# Patient Record
Sex: MALE | Race: ASIAN | NOT HISPANIC OR LATINO | Employment: PART TIME | URBAN - METROPOLITAN AREA
[De-identification: names, ages, dates, MRNs, and addresses within clinical notes are randomized per-mention and may not be internally consistent; named-entity substitution may affect disease eponyms.]

---

## 2019-03-11 ENCOUNTER — OFFICE VISIT (OUTPATIENT)
Dept: URGENT CARE | Facility: CLINIC | Age: 29
End: 2019-03-11
Payer: COMMERCIAL

## 2019-03-11 VITALS
OXYGEN SATURATION: 96 % | HEIGHT: 65 IN | BODY MASS INDEX: 22.96 KG/M2 | TEMPERATURE: 98.6 F | DIASTOLIC BLOOD PRESSURE: 68 MMHG | SYSTOLIC BLOOD PRESSURE: 108 MMHG | RESPIRATION RATE: 16 BRPM | WEIGHT: 137.8 LBS | HEART RATE: 78 BPM

## 2019-03-11 DIAGNOSIS — J22 LRTI (LOWER RESPIRATORY TRACT INFECTION): ICD-10-CM

## 2019-03-11 PROCEDURE — 99204 OFFICE O/P NEW MOD 45 MIN: CPT | Performed by: FAMILY MEDICINE

## 2019-03-11 RX ORDER — BENZONATATE 100 MG/1
100 CAPSULE ORAL 3 TIMES DAILY PRN
Qty: 60 CAP | Refills: 0 | Status: SHIPPED | OUTPATIENT
Start: 2019-03-11

## 2019-03-11 RX ORDER — AZITHROMYCIN 250 MG/1
TABLET, FILM COATED ORAL
Qty: 6 TAB | Refills: 0 | Status: SHIPPED | OUTPATIENT
Start: 2019-03-11 | End: 2019-04-29

## 2019-03-11 ASSESSMENT — ENCOUNTER SYMPTOMS
VOMITING: 0
NAUSEA: 0
MYALGIAS: 0
FEVER: 0
COUGH: 1
DIZZINESS: 0
CHILLS: 0
SORE THROAT: 0
SHORTNESS OF BREATH: 1
EYE PAIN: 0

## 2019-03-11 NOTE — PROGRESS NOTES
"  Subjective:     Cj Bermudez is a 28 y.o. male who presents for Cough (x 1 wk, some productive cough and nasal congestion just in the am)       Cough   This is a new problem. The current episode started 1 to 4 weeks ago. The problem has been gradually worsening. The problem occurs every few minutes. The cough is productive of sputum. Associated symptoms include nasal congestion and shortness of breath. Pertinent negatives include no chest pain, chills, fever, myalgias, postnasal drip, rash or sore throat.   History reviewed. No pertinent past medical history.History reviewed. No pertinent surgical history.  Social History     Social History   • Marital status: Single     Spouse name: N/A   • Number of children: N/A   • Years of education: N/A     Occupational History   • Not on file.     Social History Main Topics   • Smoking status: Never Smoker   • Smokeless tobacco: Never Used   • Alcohol use No   • Drug use: No   • Sexual activity: Not on file     Other Topics Concern   • Not on file     Social History Narrative   • No narrative on file      Family History   Problem Relation Age of Onset   • Stroke Neg Hx    • Heart Disease Neg Hx     Review of Systems   Constitutional: Negative for chills and fever.   HENT: Negative for postnasal drip and sore throat.    Eyes: Negative for pain.   Respiratory: Positive for cough and shortness of breath.    Cardiovascular: Negative for chest pain.   Gastrointestinal: Negative for nausea and vomiting.   Genitourinary: Negative for hematuria.   Musculoskeletal: Negative for myalgias.   Skin: Negative for rash.   Neurological: Negative for dizziness.   No Known Allergies   Objective:   /68 (BP Location: Left arm, Patient Position: Sitting, BP Cuff Size: Adult)   Pulse 78   Temp 37 °C (98.6 °F) (Temporal)   Resp 16   Ht 1.659 m (5' 5.3\")   Wt 62.5 kg (137 lb 12.8 oz)   SpO2 96%   BMI 22.72 kg/m²   Physical Exam   Constitutional: He is oriented to person, place, and time. " He appears well-developed and well-nourished. No distress.   HENT:   Head: Normocephalic and atraumatic.   Eyes: Pupils are equal, round, and reactive to light. Conjunctivae and EOM are normal.   Cardiovascular: Normal rate and regular rhythm.    No murmur heard.  Pulmonary/Chest: Effort normal. No respiratory distress. He has wheezes. He has no rales.   Abdominal: Soft. He exhibits no distension. There is no tenderness.   Neurological: He is alert and oriented to person, place, and time. He has normal reflexes. No sensory deficit.   Skin: Skin is warm and dry.   Psychiatric: He has a normal mood and affect.         Assessment/Plan:   Assessment    1. LRTI (lower respiratory tract infection)  - azithromycin (ZITHROMAX) 250 MG Tab; Take 2 tablets by mouth on day one. Take one tablet by mouth the remaining days until gone  Dispense: 6 Tab; Refill: 0  - benzonatate (TESSALON) 100 MG Cap; Take 1 Cap by mouth 3 times a day as needed for Cough.  Dispense: 60 Cap; Refill: 0    Differential diagnosis, natural history, supportive care, and indications for immediate follow-up discussed.

## 2019-04-29 ENCOUNTER — OFFICE VISIT (OUTPATIENT)
Dept: URGENT CARE | Facility: CLINIC | Age: 29
End: 2019-04-29
Payer: COMMERCIAL

## 2019-04-29 ENCOUNTER — APPOINTMENT (OUTPATIENT)
Dept: RADIOLOGY | Facility: IMAGING CENTER | Age: 29
End: 2019-04-29
Attending: PHYSICIAN ASSISTANT
Payer: COMMERCIAL

## 2019-04-29 VITALS
DIASTOLIC BLOOD PRESSURE: 70 MMHG | HEART RATE: 103 BPM | TEMPERATURE: 98.9 F | RESPIRATION RATE: 17 BRPM | BODY MASS INDEX: 21.66 KG/M2 | HEIGHT: 67 IN | SYSTOLIC BLOOD PRESSURE: 110 MMHG | OXYGEN SATURATION: 94 % | WEIGHT: 138 LBS

## 2019-04-29 DIAGNOSIS — J18.9 LINGULAR PNEUMONIA: ICD-10-CM

## 2019-04-29 DIAGNOSIS — R05.9 COUGH: ICD-10-CM

## 2019-04-29 PROCEDURE — 99214 OFFICE O/P EST MOD 30 MIN: CPT | Performed by: PHYSICIAN ASSISTANT

## 2019-04-29 PROCEDURE — 71046 X-RAY EXAM CHEST 2 VIEWS: CPT | Mod: TC | Performed by: PHYSICIAN ASSISTANT

## 2019-04-29 RX ORDER — DOXYCYCLINE HYCLATE 100 MG
100 TABLET ORAL 2 TIMES DAILY
Qty: 20 TAB | Refills: 0 | Status: SHIPPED | OUTPATIENT
Start: 2019-04-29 | End: 2019-05-09

## 2019-04-29 NOTE — PROGRESS NOTES
"Chief Complaint   Patient presents with   • Cough     x1mth, become worse in past 2days, started with dry cough, now white phlegm, taking tessalon from previous rx       HISTORY OF PRESENT ILLNESS: Patient is a 28 y.o. male who presents today for about 2 days of worsened cough with approx 2 months of lingering cough from last dx of LRTI in March.  Patient was treated with Zithromax pack and states there was some initial improvement however cough never full went away.  In last few days he has experienced intensified cough and some malaise. He is unsure if he has had fevers.  No SOB, \"unless I am coughing\" No chest pain or pain with breathing.  No hx of pneumonia.  No asthma.  Never smoker.  OTC not helping.     There are no active problems to display for this patient.      Allergies:Patient has no known allergies.    Current Outpatient Prescriptions Ordered in [facility]   Medication Sig Dispense Refill   • benzonatate (TESSALON) 100 MG Cap Take 1 Cap by mouth 3 times a day as needed for Cough. 60 Cap 0     No current Epic-ordered facility-administered medications on file.        No past medical history on file.    Social History   Substance Use Topics   • Smoking status: Never Smoker   • Smokeless tobacco: Never Used   • Alcohol use No       Family Status   Relation Status   • Neg Hx (Not Specified)     Family History   Problem Relation Age of Onset   • Stroke Neg Hx    • Heart Disease Neg Hx        ROS:  Review of Systems   Constitutional: SEE HPI  HENT: Negative for ear pain, nosebleeds, congestion, sore throat and neck pain.    Eyes: Negative for blurred vision.   Respiratory: SEE HPI.    Cardiovascular: Negative for chest pain, palpitations, orthopnea and leg swelling.   Gastrointestinal: Negative for heartburn, nausea, vomiting and abdominal pain.       Exam:  /70   Pulse (!) 103   Temp 37.2 °C (98.9 °F) (Temporal)   Resp 17   Ht 1.689 m (5' 6.5\")   Wt 62.6 kg (138 lb)   SpO2 94%   General:  Well " nourished, well developed male in NAD  Eyes: PERRLA, EOM within normal limits, no conjunctival injection, no scleral icterus, visual fields and acuity grossly intact.  Ears: Normal shape and symmetry, no tenderness, no discharge. External canals are without any significant edema or erythema. Tympanic membranes are without any inflammation, no effusion. Gross auditory acuity is intact  Nose: Symmetrical, sinuses without tenderness, no discharge.   Mouth: reasonable hygiene, no erythema exudates or tonsillar enlargement.  Neck: no masses, range of motion within normal limits, no tracheal deviation. No lymphadenopathy  Pulmonary: Normal respiratory effort, no wheezes. Question of left sided crackles   Cardiovascular: Mild tachy with reg rhythm without murmurs, rubs, or gallops.  Abdomen: Soft, nontender, nondistended. Normal bowel sounds. No hepatosplenomegaly or masses, or hernias. No rebound or guarding.  Skin: No visible rashes or lesion. Warm, pink, dry.   Extremities: no clubbing, cyanosis, or edema.  Neuro: A&O x 3. Speech normal/clear.  Normal gait.       RAD    FINDINGS:  The heart is normal in size.  There is increased density along the left heart border with slight loss of the left heart border.  No pleural effusions are appreciated.  No pneumothorax.   Impression       Lingular consolidation suggestive of pneumonia or atelectasis.   Reading Provider Reading Date   Brody Mcmullen M.D. Apr 29, 2019       Assessment/Plan:  1. Lingular pneumonia  DX-CHEST-2 VIEWS    doxycycline (VIBRAMYCIN) 100 MG Tab         -RAD as above.   -discussed dx in detail with patient.   Doxy rx x 10 days with strict RTC precautions if worsening at anytime or no improvement in 3-4 days.    -declines cough rx for bedtime.   -PCP follow up recommended.       Supportive care, differential diagnoses, and indications for immediate follow-up discussed with patient.   Pathogenesis of diagnosis discussed including typical length and  natural progression.   Instructed to return to clinic or nearest emergency department for any change in condition, further concerns, or worsening of symptoms.  Patient states understanding of the plan of care and discharge instructions.        Julia Albarado P.A.-C.

## 2020-08-11 ENCOUNTER — OFFICE VISIT (OUTPATIENT)
Dept: URGENT CARE | Facility: PHYSICIAN GROUP | Age: 30
End: 2020-08-11

## 2020-08-11 VITALS
HEIGHT: 66 IN | WEIGHT: 137.6 LBS | SYSTOLIC BLOOD PRESSURE: 104 MMHG | DIASTOLIC BLOOD PRESSURE: 66 MMHG | OXYGEN SATURATION: 95 % | BODY MASS INDEX: 22.11 KG/M2 | HEART RATE: 97 BPM | TEMPERATURE: 99 F | RESPIRATION RATE: 16 BRPM
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
## 2020-08-11 PROCEDURE — 99213 OFFICE O/P EST LOW 20 MIN: CPT | Performed by: PHYSICIAN ASSISTANT

## 2020-08-11 ASSESSMENT — ENCOUNTER SYMPTOMS
FOCAL WEAKNESS: 0
PALPITATIONS: 0
LOSS OF CONSCIOUSNESS: 0
BLURRED VISION: 0
NAUSEA: 0
SENSORY CHANGE: 0
EYE PAIN: 0
VOMITING: 0
NECK PAIN: 0
FEVER: 0
MYALGIAS: 1
SEIZURES: 0
PHOTOPHOBIA: 0
DOUBLE VISION: 0
CHILLS: 0
COUGH: 0
HEADACHES: 1
SHORTNESS OF BREATH: 0
DIZZINESS: 0
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
## 2022-09-06 ENCOUNTER — OFFICE VISIT (OUTPATIENT)
Dept: URGENT CARE | Facility: CLINIC | Age: 32
End: 2022-09-06

## 2022-09-06 VITALS
SYSTOLIC BLOOD PRESSURE: 104 MMHG | RESPIRATION RATE: 16 BRPM | WEIGHT: 140 LBS | BODY MASS INDEX: 21.97 KG/M2 | OXYGEN SATURATION: 94 % | DIASTOLIC BLOOD PRESSURE: 70 MMHG | HEART RATE: 85 BPM | TEMPERATURE: 98.6 F | HEIGHT: 67 IN

## 2022-09-06 DIAGNOSIS — H60.311 ACUTE DIFFUSE OTITIS EXTERNA OF RIGHT EAR: ICD-10-CM

## 2022-09-06 PROCEDURE — 99213 OFFICE O/P EST LOW 20 MIN: CPT | Performed by: PHYSICIAN ASSISTANT

## 2022-09-06 RX ORDER — OFLOXACIN 3 MG/ML
5 SOLUTION AURICULAR (OTIC) DAILY
Qty: 10 ML | Refills: 0 | Status: SHIPPED | OUTPATIENT
Start: 2022-09-06 | End: 2022-09-13

## 2022-09-06 RX ORDER — OFLOXACIN 3 MG/ML
5 SOLUTION AURICULAR (OTIC) DAILY
Qty: 10 ML | Refills: 0 | Status: SHIPPED | OUTPATIENT
Start: 2022-09-06 | End: 2022-09-06

## 2022-09-06 RX ORDER — AMOXICILLIN AND CLAVULANATE POTASSIUM 875; 125 MG/1; MG/1
1 TABLET, FILM COATED ORAL 2 TIMES DAILY
Qty: 14 TABLET | Refills: 0 | Status: SHIPPED | OUTPATIENT
Start: 2022-09-06 | End: 2022-09-13

## 2022-09-06 RX ORDER — CIPROFLOXACIN AND DEXAMETHASONE 3; 1 MG/ML; MG/ML
4 SUSPENSION/ DROPS AURICULAR (OTIC) 2 TIMES DAILY
Qty: 2.8 ML | Refills: 0 | Status: SHIPPED | OUTPATIENT
Start: 2022-09-06 | End: 2022-09-13

## 2022-09-06 ASSESSMENT — ENCOUNTER SYMPTOMS
DIZZINESS: 0
NAUSEA: 0
FEVER: 0
HEADACHES: 0
CHILLS: 0
SORE THROAT: 0
VOMITING: 0

## 2022-09-06 NOTE — PROGRESS NOTES
Subjective     Cj Bermudez is a 31 y.o. male who presents with Earache (Right ear, possible ear infection x2 weeks, but has gotten worst past x2 days)    HPI:  Cj Bermudez is a 31 y.o. male who presents today for evaluation of right ear pain.  Patient reports that he has been having pain in his right ear for the past 2 weeks.  It has been gradually worsening and has gotten much worse over the past 2 days.  Last night he actually started to notice liquid coming out of his right ear.  He denies any recent illnesses.  Says that he has not been swimming.  Denies any sore throat, fever/chills, lightheadedness/dizziness.  No similar symptoms in the left ear.  Denies any history of chronic or recurrent ear infections.  Never had tympanostomy tubes placed.      Review of Systems   Constitutional:  Negative for chills and fever.   HENT:  Positive for ear discharge, ear pain and hearing loss (muffled hearing in right ear). Negative for congestion and sore throat.    Gastrointestinal:  Negative for nausea and vomiting.   Neurological:  Negative for dizziness and headaches.       PMH:  has no past medical history of Asthma, Cancer ([facility]), Diabetes ([facility]), or Heart murmur.  MEDS:   Current Outpatient Medications:     amoxicillin-clavulanate (AUGMENTIN) 875-125 MG Tab, Take 1 Tablet by mouth 2 times a day for 7 days., Disp: 14 Tablet, Rfl: 0    ciprofloxacin/dexamethasone (CIPRODEX) 0.3-0.1 % Suspension, Administer 4 Drops into the right ear 2 times a day for 7 days., Disp: 2.8 mL, Rfl: 0    ofloxacin otic sol (FLOXIN OTIC) 0.3 % Solution, Administer 5 Drops into the right ear every day for 7 days., Disp: 10 mL, Rfl: 0    benzonatate (TESSALON) 100 MG Cap, Take 1 Cap by mouth 3 times a day as needed for Cough. (Patient not taking: Reported on 9/6/2022), Disp: 60 Cap, Rfl: 0  ALLERGIES: No Known Allergies  SURGHX: History reviewed. No pertinent surgical history.  SOCHX:  reports that he has never smoked. He has never used smokeless  "tobacco. He reports current alcohol use. He reports that he does not use drugs.  FH: Family history was reviewed, no pertinent findings to report      Objective     /70 (BP Location: Left arm, Patient Position: Sitting, BP Cuff Size: Adult)   Pulse 85   Temp 37 °C (98.6 °F) (Temporal)   Resp 16   Ht 1.689 m (5' 6.5\")   Wt 63.5 kg (140 lb)   SpO2 94%   BMI 22.26 kg/m²      Physical Exam  Constitutional:       General: He is not in acute distress.     Appearance: He is not diaphoretic.   HENT:      Head: Normocephalic and atraumatic.      Right Ear: External ear normal. Drainage, swelling and tenderness present.      Left Ear: Tympanic membrane, ear canal and external ear normal.      Ears:      Comments: Right ear canal exhibits tenderness as well as tenderness with palpation of the tragus.  There is diffuse swelling and discharge noted with some purulent debris scattered throughout the ear canal.  Unable to visualize the TM secondary to swelling and purulent debris.  Eyes:      Conjunctiva/sclera: Conjunctivae normal.      Pupils: Pupils are equal, round, and reactive to light.   Pulmonary:      Effort: Pulmonary effort is normal. No respiratory distress.   Musculoskeletal:      Cervical back: Normal range of motion.   Skin:     Findings: No rash.   Neurological:      Mental Status: He is alert and oriented to person, place, and time.   Psychiatric:         Mood and Affect: Mood and affect normal.         Cognition and Memory: Memory normal.         Judgment: Judgment normal.         Assessment & Plan     1. Acute diffuse otitis externa of right ear  - amoxicillin-clavulanate (AUGMENTIN) 875-125 MG Tab; Take 1 Tablet by mouth 2 times a day for 7 days.  Dispense: 14 Tablet; Refill: 0  - ciprofloxacin/dexamethasone (CIPRODEX) 0.3-0.1 % Suspension; Administer 4 Drops into the right ear 2 times a day for 7 days.  Dispense: 2.8 mL; Refill: 0  - ofloxacin otic sol (FLOXIN OTIC) 0.3 % Solution; Administer 5 " Drops into the right ear every day for 7 days.  Dispense: 10 mL; Refill: 0  Patient appears to have right external ear infection.  Possibly secondary to middle ear infection with perforated TM.  Unable to visualize the TM at this time, however.  We will treat with oral Augmentin as well as Ciprodex eardrops.  Patient was given a contingent prescription for ofloxacin otic to use, however, if the Ciprodex is too expensive.  He was advised to not use any type of earplugs or Air Pods and to try to keep his ears as dry as possible for the next week.  He may use OTC analgesics as needed for pain.  If he has no improvement in symptoms after being on the medications for 72 hours he should return for reevaluation.                  Differential Diagnosis, natural history, and supportive care discussed. Return to the Urgent Care or follow up with your PCP if symptoms fail to resolve, or for any new or worsening symptoms. Emergency room precautions discussed. Patient and/or family appears understanding of information.

## 2024-07-12 ENCOUNTER — OFFICE VISIT (OUTPATIENT)
Dept: URGENT CARE | Facility: CLINIC | Age: 34
End: 2024-07-12
Payer: COMMERCIAL

## 2024-07-12 VITALS
DIASTOLIC BLOOD PRESSURE: 62 MMHG | HEIGHT: 66 IN | RESPIRATION RATE: 16 BRPM | BODY MASS INDEX: 26.03 KG/M2 | OXYGEN SATURATION: 93 % | TEMPERATURE: 98.9 F | SYSTOLIC BLOOD PRESSURE: 118 MMHG | HEART RATE: 106 BPM | WEIGHT: 162 LBS

## 2024-07-12 DIAGNOSIS — H66.001 ACUTE SUPPURATIVE OTITIS MEDIA OF RIGHT EAR WITHOUT SPONTANEOUS RUPTURE OF TYMPANIC MEMBRANE, RECURRENCE NOT SPECIFIED: ICD-10-CM

## 2024-07-12 PROCEDURE — 3074F SYST BP LT 130 MM HG: CPT | Performed by: FAMILY MEDICINE

## 2024-07-12 PROCEDURE — 3078F DIAST BP <80 MM HG: CPT | Performed by: FAMILY MEDICINE

## 2024-07-12 PROCEDURE — 99213 OFFICE O/P EST LOW 20 MIN: CPT | Performed by: FAMILY MEDICINE

## 2024-07-12 RX ORDER — AMOXICILLIN 875 MG/1
875 TABLET, COATED ORAL 2 TIMES DAILY
Qty: 14 TABLET | Refills: 0 | Status: SHIPPED | OUTPATIENT
Start: 2024-07-12 | End: 2024-07-19

## 2024-07-12 ASSESSMENT — ENCOUNTER SYMPTOMS
EYE DISCHARGE: 0
WEIGHT LOSS: 0
EYE REDNESS: 0
NAUSEA: 0
VOMITING: 0
MYALGIAS: 0

## 2024-07-19 ENCOUNTER — OFFICE VISIT (OUTPATIENT)
Dept: URGENT CARE | Facility: PHYSICIAN GROUP | Age: 34
End: 2024-07-19
Payer: COMMERCIAL

## 2024-07-19 VITALS
OXYGEN SATURATION: 99 % | RESPIRATION RATE: 18 BRPM | BODY MASS INDEX: 26.68 KG/M2 | WEIGHT: 166 LBS | TEMPERATURE: 98.4 F | SYSTOLIC BLOOD PRESSURE: 110 MMHG | DIASTOLIC BLOOD PRESSURE: 66 MMHG | HEART RATE: 74 BPM | HEIGHT: 66 IN

## 2024-07-19 DIAGNOSIS — L50.9 URTICARIAL RASH: ICD-10-CM

## 2024-07-19 PROCEDURE — 3074F SYST BP LT 130 MM HG: CPT

## 2024-07-19 PROCEDURE — 3078F DIAST BP <80 MM HG: CPT

## 2024-07-19 PROCEDURE — 99213 OFFICE O/P EST LOW 20 MIN: CPT

## 2024-07-19 RX ORDER — METHYLPREDNISOLONE 4 MG/1
TABLET ORAL
Qty: 21 TABLET | Refills: 0 | Status: SHIPPED | OUTPATIENT
Start: 2024-07-19

## 2024-07-19 ASSESSMENT — ENCOUNTER SYMPTOMS
VOMITING: 0
CHILLS: 0
COUGH: 0
SORE THROAT: 0
MYALGIAS: 0
FEVER: 0
DIARRHEA: 0
NAUSEA: 0
ABDOMINAL PAIN: 0
HEADACHES: 0
SHORTNESS OF BREATH: 0